# Patient Record
Sex: MALE | Race: OTHER | NOT HISPANIC OR LATINO | URBAN - METROPOLITAN AREA
[De-identification: names, ages, dates, MRNs, and addresses within clinical notes are randomized per-mention and may not be internally consistent; named-entity substitution may affect disease eponyms.]

---

## 2023-05-06 ENCOUNTER — EMERGENCY (EMERGENCY)
Facility: HOSPITAL | Age: 23
LOS: 1 days | Discharge: HOME | End: 2023-05-06
Attending: STUDENT IN AN ORGANIZED HEALTH CARE EDUCATION/TRAINING PROGRAM
Payer: MEDICAID

## 2023-05-06 VITALS
DIASTOLIC BLOOD PRESSURE: 77 MMHG | SYSTOLIC BLOOD PRESSURE: 117 MMHG | RESPIRATION RATE: 17 BRPM | TEMPERATURE: 98 F | HEART RATE: 103 BPM | OXYGEN SATURATION: 99 %

## 2023-05-06 VITALS
SYSTOLIC BLOOD PRESSURE: 128 MMHG | OXYGEN SATURATION: 95 % | HEART RATE: 116 BPM | TEMPERATURE: 98 F | RESPIRATION RATE: 18 BRPM | DIASTOLIC BLOOD PRESSURE: 75 MMHG

## 2023-05-06 PROCEDURE — 96372 THER/PROPH/DIAG INJ SC/IM: CPT

## 2023-05-06 PROCEDURE — 99285 EMERGENCY DEPT VISIT HI MDM: CPT | Mod: 25

## 2023-05-06 PROCEDURE — 82962 GLUCOSE BLOOD TEST: CPT

## 2023-05-06 PROCEDURE — 99053 MED SERV 10PM-8AM 24 HR FAC: CPT

## 2023-05-06 PROCEDURE — 99284 EMERGENCY DEPT VISIT MOD MDM: CPT

## 2023-05-06 RX ORDER — HALOPERIDOL DECANOATE 100 MG/ML
5 INJECTION INTRAMUSCULAR ONCE
Refills: 0 | Status: COMPLETED | OUTPATIENT
Start: 2023-05-06 | End: 2023-05-06

## 2023-05-06 RX ADMIN — HALOPERIDOL DECANOATE 5 MILLIGRAM(S): 100 INJECTION INTRAMUSCULAR at 04:05

## 2023-05-06 RX ADMIN — Medication 2 MILLIGRAM(S): at 04:03

## 2023-05-06 NOTE — ED ADULT NURSE NOTE - PAIN: PRESENCE, MLM
Anesthesia Pre Eval Note    Anesthesia ROS/Med Hx        Anesthetic Complication History:  Patient does not have a history of anesthetic complications      Pulmonary Review:  Patient does not have a pulmonary history      Neuro/Psych Review:    Positive for psychiatric history - Depression    Cardiovascular Review:  Patient does not have a cardiovascular history       GI/HEPATIC/RENAL Review:  Patient does not have a GI/hepatic/renalhistory       End/Other Review:  Patient does not have an endo/other history        Relevant Problems   No relevant active problems       Physical Exam     Airway   Mallampati: II  TM Distance: >3 FB  Neck ROM: Full  Neck: Non-tender and Able to place in sniff position  TMJ Mobility: Good    Cardiovascular  Cardiovascular exam normal  Cardio Rhythm: Regular  Cardio Rate: Normal    Head Assessment  Head assessment: Normocephalic and Atraumatic    General Assessment  General Assessment: Alert and oriented and No acute distress    Dental Exam  Dental exam normal    Pulmonary Exam  Pulmonary exam normal  Breath sounds clear to auscultation:  Yes    Abdominal Exam  Abdominal exam normal      Anesthesia Plan:    ASA Status: 2  Anesthesia Type: General    Induction: Intravenous  Preferred Airway Type: ETT  Maintenance: Inhalational  Premedication: IV      Post-op Pain Management: Per Surgeon      Checklist  Reviewed: Lab Results, EKG, Nursing Notes, Beta Blocker Status, Patient Summary, Care Everywhere, DNR Status, Allergies, Medications, Problem list, NPO Status, Past Med History and Outside Records  Consent/Risks Discussed Statement:  The proposed anesthetic plan, including its risks and benefits, have been discussed with the Patient along with the risks and benefits of alternatives. Questions were encouraged and answered and the patient and/or representative understands and agrees to proceed.        I discussed with the patient (and/or patient's legal representative) the risks and benefits  of the proposed anesthesia plan, General, which may include services performed by other anesthesia providers.    Alternative anesthesia plans, if available, were reviewed with the patient (and/or patient's legal representative). Discussion has been held with the patient (and/or patient's legal representative) regarding risks of anesthesia, which include Nausea, Vomiting, Sore Throat, Dental Injury, Post-op Intubation, Allergic Reaction, ICU Admit, Need for Blood Transfusion, Intra-operative Awareness, Aspiration, Hypotension and Emergence Delirium and emergent situations that may require change in anesthesia plan.    The patient (and/or patient's legal representative) has indicated understanding, his/her questions have been answered, and he/she wishes to proceed with the planned anesthetic.    Comments  Plan Comments: NL EKG.  Labs reviewed.     non-verbal indicators absent (Rating = 0)

## 2023-05-06 NOTE — ED PROVIDER NOTE - NSFOLLOWUPINSTRUCTIONS_ED_ALL_ED_FT
Romanian    Intoxicación alcohólica  Alcohol Intoxication  La intoxicación alcohólica ocurre cuando hemant persona ya no piensa con claridad ni se desempeña veronica (experimenta un deterioro) después de consumir bebidas alcohólicas. La intoxicación alcohólica puede ocurrir tan solo con hemant copa. El definición legal de la intoxicación alcohólica depende de la cantidad de alcohol en la melisa (concentración de alcohol en la melisa, NOLVIA). Hemant NOLVIA de 80 a 100 mg/dl o mayor se considera legalmente elmo hemant intoxicación alcohólica. El nivel de deterioro depende de lo siguiente:  La cantidad de alcohol que la persona bebió.  La edad, el sexo y el peso de la persona.  La frecuencia con la que la persona tyler.  Si la persona tiene otras enfermedades, tales elmo diabetes, convulsiones o hemant afección cardíaca.  La intoxicación alcohólica puede variar de leve a grave. La afección puede ser peligrosa, especialmente si la persona:  También usa ciertas drogas ilegales y medicamentos recetados.  Tyler hemant gran cantidad de alcohol en un periodo corto de tiempo (consume alcohol compulsivamente).  En las mujeres, el consumo de alcohol compulsivo significa rafael cuatro o más medidas de alcohol a la vez.  En los hombres, el consumo de alcohol compulsivo significa beber susanne o más medidas de alcohol a la vez.  Si usted o alguien a haq alrededor parece estar embriagado, diga algo y actúe.    ¿Cuáles son las causas?  La causa de esta afección es el consumo de alcohol.    ¿Qué incrementa el riesgo?  Los siguientes factores pueden hacer que sea más propenso a contraer esta afección:  Presión de los pares en los adultos jóvenes.  Dificultad para manejar el estrés.  Antecedentes de consumo excesivo de drogas o alcohol.  Combinación de alcohol con drogas.  Antecedentes familiares de consumo excesivo de alcohol o drogas.  Peso corporal bajo.  Consumo de alcohol compulsivo.  ¿Cuáles son los signos o síntomas?  Los síntomas de la intoxicación alcohólica pueden variar de hemant persona a otra. Los síntomas pueden ser leves, moderados o graves.    Los síntomas de hemant intoxicación alcohólica leve pueden incluir los siguientes:  Sensación de relajación o somnolencia.  Tener hemant leve dificultad con la coordinación, el habla, la memoria o la atención.  Los síntomas de hemant intoxicación alcohólica moderada pueden incluir los siguientes:  Kriss natty o tristeza intensa.  Tener dificultad con la coordinación, el habla, la memoria o la atención.  Los síntomas de hemant intoxicación alcohólica grave pueden incluir los siguientes:  Tener hemant seria dificultad con la coordinación, el habla, la memoria o la atención.  Desmayo.  Vómitos.  Confusión.  Respiración lenta.  Coma.  La intoxicación alcohólica puede cambiar rápidamente de leve a grave. Puede causar coma o la muerte, especialmente en las personas que no están expuestas al alcohol con frecuencia.    ¿Cómo se diagnostica?  El médico le preguntará la cantidad y el tipo de bebida alcohólica que bebió. La intoxicación también puede diagnosticarse en función de:  Los síntomas y los antecedentes médicos.  Un examen físico.  Un análisis de melisa que mide la NOLVIA.  El olor a alcohol en el aliento.  ¿Cómo se trata?  El tratamiento para la intoxicación por alcohol puede incluir:  Ser controlado en un departamento de emergencias, hospital o centro de tratamiento hasta que la NOLVIA disminuya y sea seguro para usted regresar a haq casa.  Líquidos intravenosos (i.v.) para prevenir o tratar la pérdida de líquido en el cuerpo (deshidratación).  Medicamentos para tratar las náuseas o los vómitos, o para eliminar el alcohol del cuerpo.  Asesoramiento (intervención breve) sobre los peligros de consumir alcohol.  Tratamiento para el trastorno por el consumo de sustancias.  Oxigenoterapia o hemant máquina para respirar (respirador).  La exposición a timur plazo (crónica) al alcohol puede tener efectos a timur plazo en el cerebro, el corazón y el sistema digestivo. Estos efectos pueden ser graves y pueden requerir tratamiento.    Siga estas instrucciones en haq casa:  A sign showing that a person should not drive.  Comida y bebida    A 12-ounce bottle of beer, a 5-ounce glass of wine, and a 1.5-ounce shot of hard liquor.  Three cups showing dark yellow, yellow, and pale yellow urine.  No nina alcohol si:  Haq médico le indica no hacerlo.  Está embarazada, puede estar embarazada o está tratando de quedar embarazada.  No tiene la edad legal para beber (mayor de 21 años de edad en los [place]).  Está tomando medicamentos que no se deben rafael con alcohol.  Tiene hemant afección médica y el alcohol la empeora.  Tiene que conducir o realizar actividades que requieren que esté alerta.  Tiene un trastorno por abuso de sustancias.  Pregúntele al médico si el alcohol es seguro para usted. Si el médico le permite beber alcohol, limite la cantidad que maricarmen. Puede beber:  De 0 a 1 medida por día para las mujeres.  De 0 a 2 medidas por día para los hombres.  Esté atento a la cantidad de alcohol que hay en las bebidas que maricarmen. En los [place], hemant medida equivale a hemant botella de cerveza de 12 oz (355 ml), un vaso de vino de 5 oz (148 ml) o un vaso de hemant bebida alcohólica de prachi graduación de 1½ oz (44 ml).  Evite beber alcohol con el estómago vacío.  Manténgase hidratado. Nina suficiente líquido elmo para mantener la orina de color amarillo pálido. Evite la cafeína, ya que puede deshidratarlo.  Evite consumir más de hemant bebida alcohólica por hora.  Cuando nina más que hemant medida de alcohol, nina agua o hemant bebida sin alcohol entre las bebidas alcohólicas.  Instrucciones generales    Use los medicamentos de venta amari y los recetados solamente elmo se lo haya indicado el médico.  No conduzca después de beber cualquier cantidad de alcohol. Organice un conductor designado u otra forma de volver a casa.  Pídale a alguna persona responsable que se quede con usted mientras está embriagado. No debería quedarse solo.  Concurra a todas las visitas de seguimiento elmo se lo haya indicado el médico. [place] es importante.  Comuníquese con un médico si:  No mejora luego de algunos días.  Tiene problemas en el trabajo, la escuela o el hogar debido al consumo de alcohol.  Solicite ayuda inmediatamente si:  Tiene alguno de los siguientes síntomas:  Grave dificultad con la coordinación, el habla, la memoria o la atención.  Dificultad para mantenerse despierto.  Confusión grave.  Hemant convulsión.  Desvanecimiento.  Desmayos.  Vómitos con melisa de color murdock brillante o hemant sustancia parecida a la borra del café.  Tiene melisa en la materia fecal (heces). La melisa puede hacer que las heces tengan color murdock brillante, color kirill o aspecto alquitranado. También pueden tener mal olor.  Temblores al tratar de abandonar el hábito de beber.  Pensamientos acerca de lastimarse a sí mismo o a otras personas.  Si alguna vez siente que puede lastimarse o lastimar a otras personas, o tiene pensamientos de poner fin a haq sarai, busque ayuda de inmediato. Puede dirigirse al servicio de emergencias más cercano o comunicarse con:  Servicio de emergencias de haq localidad (911 en [place]  Hemant línea de ayuda para crisis suicidas, elmo la National Suicide Prevention Lifeline (L[place]nea [employer] para la Prevención del Suicidio) al 1-707.126.8594 o al 988 en los [place]. Esta línea atiende las 24 horas del día.  Resumen  La intoxicación alcohólica ocurre cuando hemant persona ya no piensa con claridad ni se desempeña veronica después de consumir bebidas alcohólicas.  Si el médico le dice que no corre riesgos al beber alcohol, limite haq consumo a no más de 1 medida al día si es brittnee (no nina si está embarazada) y 2 medidas si es hombre. Hemant medida equivale a 12 onzas de cerveza, 5 onzas de vino o 1½ onzas de bebidas alcohólicas de prachi graduación.  Comuníquese con el médico si el consumo de alcohol le ha causado problemas en el trabajo, en la escuela o en haq casa.  Busque ayuda de inmediato si tiene pensamientos acerca de lastimarse a usted mismo o a otras personas.  Esta información no tiene elmo fin reemplazar el consejo del médico. Asegúrese de hacerle al médico cualquier pregunta que tenga.    Document Revised: 11/09/2022 Document Reviewed: 11/09/2022  Elsevier Patient Education © 2023 [employer]

## 2023-05-06 NOTE — ED PROVIDER NOTE - CLINICAL SUMMARY MEDICAL DECISION MAKING FREE TEXT BOX
22-year-old male with unknown past medical history brought in by police for alcohol intoxication.  Patient is uncooperative with the staff in the ED and verbally abusive.  Attempted to verbally de-escalate the situation–failed.  Code gray is called.  Patient is given Haldol and Ativan.    7:30 AM–patient is still sleeping

## 2023-05-06 NOTE — ED PROVIDER NOTE - OBJECTIVE STATEMENT
22-year-old male with unknown past medical history brought in by police for alcohol intoxication.  As per the triage note patient was found at home.  Patient is not answering any questions.  Appears angry.

## 2023-05-06 NOTE — ED PROVIDER NOTE - PROGRESS NOTE DETAILS
Santos:  Patient received in signout from , pending reassessment for alcohol intoxication.  He has been given Haldol and Ativan due to aggressive behavior and agitation.  I reassessed him at approximately 9:20 AM and he is still drowsy. Will continue to monitor and reassess. Santos:  Pt now AAO x 3 with steady gait, clinically sober.  Will D/C with return precautions.

## 2023-05-06 NOTE — ED PROVIDER NOTE - PATIENT PORTAL LINK FT
You can access the FollowMyHealth Patient Portal offered by [facility] by registering at the following website: [email or web address]. By joining [facility]’s FollowMyHealth portal, you will also be able to view your health information using other applications (apps) compatible with our system.

## 2023-05-06 NOTE — ED PROVIDER NOTE - PHYSICAL EXAMINATION
Patient is walking around in the ED.  No distress.  Appears intoxicated.  Patient is uncooperative with the staff -keeps on walking away from his stretcher, unsteady gait, keeps on talking about" lion Darius" patient is verbally abusive.

## 2025-01-28 NOTE — ED ADULT NURSE NOTE - CAS TRG GENERAL AIRWAY, MLM
Addended by: DELORIS FIGUEROA on: 1/28/2025 11:37 AM     Modules accepted: Orders, Level of Service    
Patent